# Patient Record
Sex: FEMALE | Race: BLACK OR AFRICAN AMERICAN | URBAN - METROPOLITAN AREA
[De-identification: names, ages, dates, MRNs, and addresses within clinical notes are randomized per-mention and may not be internally consistent; named-entity substitution may affect disease eponyms.]

---

## 2018-08-15 ENCOUNTER — HISTORICAL (OUTPATIENT)
Dept: ADMINISTRATIVE | Facility: HOSPITAL | Age: 53
End: 2018-08-15

## 2018-08-15 LAB
(HCYS)2 SERPL-MCNC: 7.1 MCMOL/L (ref 3.2–10.7)
AT III ACT/NOR PPP CHRO: 113 % (ref 82–139)
PHOSPHOLIPID AB COM-LC: NORMAL
PHOSPHOLIPID AB INT-LC: NORMAL

## 2018-11-12 ENCOUNTER — HISTORICAL (OUTPATIENT)
Dept: ADMINISTRATIVE | Facility: HOSPITAL | Age: 53
End: 2018-11-12

## 2018-11-12 LAB
(HCYS)2 SERPL-MCNC: 8.1 MCMOL/L (ref 3.2–10.7)
AT III ACT/NOR PPP CHRO: 99 % (ref 82–139)
PHOSPHOLIPID AB COM-LC: NORMAL
PHOSPHOLIPID AB INT-LC: NEGATIVE

## 2022-04-10 ENCOUNTER — HISTORICAL (OUTPATIENT)
Dept: ADMINISTRATIVE | Facility: HOSPITAL | Age: 57
End: 2022-04-10

## 2022-04-24 VITALS
WEIGHT: 142 LBS | SYSTOLIC BLOOD PRESSURE: 126 MMHG | BODY MASS INDEX: 26.13 KG/M2 | DIASTOLIC BLOOD PRESSURE: 88 MMHG | HEIGHT: 62 IN

## 2022-11-30 ENCOUNTER — TELEPHONE (OUTPATIENT)
Dept: NEUROLOGY | Facility: CLINIC | Age: 57
End: 2022-11-30

## 2022-11-30 DIAGNOSIS — R55 SYNCOPE AND COLLAPSE: ICD-10-CM

## 2022-11-30 DIAGNOSIS — R42 DIZZINESS AND GIDDINESS: Primary | ICD-10-CM

## 2022-11-30 DIAGNOSIS — G45.0 VERTEBROBASILAR ARTERY SYNDROME: ICD-10-CM

## 2022-11-30 NOTE — TELEPHONE ENCOUNTER
Patient was seen in past for stroke. Last appt on 03/03/22.    Reports waking up lately with light headaches and dizziness and this occurs twice a week. States symptoms do not subside until she takes tylenol.     Asking for advise. Does pt need appt to be evaluated? No follow up currently scheduled. Next opening in January.     Phone: [phone]

## 2022-12-01 NOTE — TELEPHONE ENCOUNTER
Spoke to patient; wakes in middle of night with pressure in her head; takes Tylenol and goes away; also has dizziness. This has been ongoing for month    I asked for a Mri brain w/o contrast    Dari, please try to get this ruthy as soon as possible; she is asking for [facility] sunset    I advised her to go to ER with any symptoms such as vision changes, unilateral weakness, or speech changes.    Of note, she is suffering with sinus issues but that just started a few days ago; the dizziness and posterior head pressure has been ongoing for a month now.

## 2022-12-12 ENCOUNTER — TELEPHONE (OUTPATIENT)
Dept: NEUROLOGY | Facility: CLINIC | Age: 57
End: 2022-12-12

## 2022-12-12 NOTE — TELEPHONE ENCOUNTER
Patient completed MRI last week. States she had imaging done at imaging center in [place].     I was unable to find results in chart.     Pt would like a call back to discuss results once received.     Phone: [phone]